# Patient Record
(demographics unavailable — no encounter records)

---

## 2019-07-14 NOTE — DIAGNOSTIC IMAGING REPORT
INDICATION: Fall, right shoulder pain



FINDINGS:  

Three views of the right shoulder show no fracture, dislocation

or other acute bony abnormality. There are mild degenerative

changes at the glenohumeral joint. There is slight narrowing of

the space between the humeral head and acromion which may be

secondary to rotator cuff disease.



IMPRESSION:

There are degenerative changes present with no acute abnormality

seen.



Dictated by: 



  Dictated on workstation # [id number]

## 2019-07-25 NOTE — DIAGNOSTIC IMAGING REPORT
EXAMINATION: Magnetic resonance imaging of the right shoulder

without contrast. 



DATE: July 25, 2019.



COMPARISON: Right shoulder radiograph July 14, 2019.



HISTORY: 69-year-old male, injury one week ago. Right shoulder

pain.



TECHNIQUE: Magnetic Resonance Imaging sequences were performed of

the shoulder without contrast.  



FINDINGS: 



ROTATOR CUFF, LIGAMENTS, TENDONS, AND MUSCLES: 

There are full-thickness fullwidth tears of the supraspinatus and

infraspinatus tendons with tendon retraction near the level of

the glenoid. The teres minor tendon is intact. There is a

full-thickness tear of the subscapularis tendon with tendon

retraction estimated at 1.5 cm.



There is no pronounced fatty atrophy of the rotator cuff

musculature. There is very low-level edema in the supraspinatus

and infraspinatus muscles.



LONG HEAD OF BICEPS:

The long head of biceps tendon is medially subluxed and partially

perched on the lesser tuberosity. The biceps labral attachment is

intact.    



GLENOHUMERAL JOINT: 

The humeral head is superiorly subluxed and directly abuts the

undersurface of the acromion. There is mild posterior subluxation

of the humeral head relative to the glenoid. The labrum is

grossly intact. There is no identified paralabral cyst. The

articular cartilage is grossly intact. There is a small

glenohumeral joint effusion. 



ACROMIOCLAVICULAR JOINT: 

The acromioclavicular joint is normally aligned. The

coracoclavicular and coracoacromial ligaments are intact. There

are mild acromioclavicular degenerative changes without large

undersurface osteophyte. 



BONE: 

There is no os acromiale. Additional osseous morphology is

unremarkable. The bone marrow signal is within normal limits.

Specifically, negative for fracture, osteomyelitis,

osteonecrosis, or marrow replacing process.



BURSAE AND SOFT TISSUES: 

There is fluid in the subacromial subdeltoid bursa compatible

with the full-thickness rotator cuff tendon tears, bursitis,

and/or recent injection.



IMPRESSION: 



1. Full-thickness fullwidth tears of the supraspinatus and

infraspinatus tendons as well as subscapularis. The supraspinatus

and infraspinatus tendons are retracted near the level of the

glenoid. There is estimated 1.5 cm retraction of the

subscapularis tendon. Very low level edema in the supraspinatus

and infraspinatus muscles without prominent fatty muscle atrophy.

The edema could relate to low-grade muscle strains and/or early

denervation related signal changes.

2. Mild acromioclavicular degenerative changes without

undersurface osteophyte.

3. Superiorly subluxed and mildly posteriorly subluxed humeral

head with small glenohumeral joint effusion. Grossly intact

labrum.

4. No acute fracture, bone contusion, or evidence of

osteonecrosis.

5. Fluid in the subacromial subdeltoid bursa compatible with the

full-thickness rotator cuff tendon tear, bursitis, and/or recent

injection.



Dictated by: 



  Dictated on workstation # [id number]

## 2019-08-19 NOTE — STRESS TEST
DATE OF SERVICE:  08/16/2019



RESTING AND POST REGADENOSON TECHNETIUM-99M TETROFOSMIN SPECT CT IMAGING



ORDERING PHYSICIAN:

VIOLA Smith



PRIMARY PHYSICIAN:

Dr. Bañuelos.



OTHER PHYSICIAN:

Johnna Bauer MD, MA, FACP, FACC



CLINICAL DIAGNOSES:

Coronary artery disease.



Baseline images were carried out after injection of 10.68 mCi of technetium-99m

Tetrofosmin.  This was followed by 0.4 mg regadenoson and 28.4 mCi of

technetium-99m Tetrofosmin for stress imaging.  The electrocardiogram showed

sinus rhythm at baseline.  It did not change significantly with regadenoson

infusion.  The patient tolerated the procedure well.  Review of images at rest

and following stress does not indicate any distinct perfusion defects consistent

with significant myocardial ischemia or infarction.  Gated images showed normal

global left ventricular systolic function, normal regional wall motion.  Left

ventricular ejection fraction is calculated to be 55%.  Left ventricular end

diastolic volume is 140 mL.  TID is absent (1.07).



CONCLUSIONS:

1.  Mild-to-moderate cardiomegaly without evidence of significant myocardial

ischemia or infarction.

2.  Normal regional wall motion.

3.  Normal global left ventricular systolic function with a calculated ejection

fraction of 55%.





Job ID: [id number]

DocumentID: [id number]

Dictated Date:  08/19/2019 11:43:23

Transcription Date: 08/19/2019 15:03:20

Dictated By: JOHNNA BAUER MD, MA, FACP, FACC,

## 2020-02-07 NOTE — ED SYNCOPE
General


Chief Complaint:  Dizziness/Syncope


Stated Complaint:  DIZZY


Source of Information:  Patient, Spouse


Exam Limitations:  No Limitations





History of Present Illness


Date Seen by Provider:  Feb 6, 2020


Time Seen by Provider:  23:48


Initial Comments


The patient arrives the ER by private conveyance with his spouse and chief 

complaint for the past week he's been having intermittent episodes lasting a few

minutes at the time of feeling like his head was full, big and like he was going

to pass out. He has not had any syncopal episodes. Tonight he woke up around 

10:00 with this symptom and it has not gone away. He has tried Flonase couple 

times. He's tried some over-the-counter cough and cold/congestion medications 

with no relief. He used to follow with Dr. Bañuelos but recently established care 

3 weeks ago with Arjun Briceno and has not followed up since then. He is not

having any chest pain, irregular heartbeat or palpitations. No history of 

dysrhythmias. He did have a CABG 5 years ago. He is not having any shortness of 

breath or exertional dyspnea. No nausea vomiting diarrhea, abdominal pain, 

dysuria. He does not routinely drink or smoke.





Allergies and Home Medications


Allergies


Coded Allergies:  


     No Known Drug Allergies (Verified , 2/6/08)





Home Medications


Ascorbic Acid 500 Mg Tablet, 500 MG PO DAILY, (Reported)


Aspirin 81 Mg Tabec, 81 MG PO DAILY, (Reported)


Atenolol 25 Mg Tablet, 25 MG PO DAILY, (Reported)


Cholecalciferol 2,000 Unit Capsule, 2,000 UNIT PO DAILY, (Reported)


Clopidogrel 75 Mg Tablet, 75 MG PO DAILY, (Reported)


Cyanocobalamin 1,000 Mcg Tablet.sa, 1,000 MCG PO DAILY, (Reported)


Enalapril Maleate 10 Mg Tablet, 10 MG PO BID, (Reported)


Nifedipine 60 Mg Tab, 60 MG PO DAILY, (Reported)


Omega 3 Polyunsat Fatty Acids 1,000 Mg Cap, 1,000 MG PO BID, (Reported)


Simvastatin 40 Mg Tablet, 40 MG PO HS, (Reported)





Patient Home Medication List


Home Medication List Reviewed:  Yes





Review of Systems


Constitutional:  see HPI; No chills; dizziness; No fever, No malaise


EENTM:  see HPI (ear pressure), nose congestion; No ear discharge, No ear pain


Respiratory:  No cough, No dyspnea on exertion, No short of breath, No wheezing


Cardiovascular:  No chest pain, No edema; Hx of Intervention; No palpitations; 

syncope (near syncope without), vascular heart diseas


Gastrointestinal:  No abdominal pain, No constipation, No diarrhea, No nausea, 

No vomiting


Genitourinary:  No discharge, No dysuria


Musculoskeletal:  No back pain, No joint pain


Psychiatric/Neurological:  Denies Anxiety, Denies Depressed





All Other Systems Reviewed


Negative Unless Noted:  Yes





Past Medical-Social-Family Hx


Patient Social History


Alcohol Use:  Denies Use


Recreational Drug Use:  No


Smoking Status:  Never a Smoker


Recent Foreign Travel:  No


Contact w/Someone Who Travel:  No





Immunizations Up To Date


Date of Influenza Vaccine:  Oct 14, 2014





Past Medical History


Reproductive Disorders:  No





Physical Exam


Vital Signs





Vital Signs - First Documented








 2/7/20





 00:17


 


Pulse 63





 62





 60


 


B/P (MAP) 154/82 (106)





 154/78 (103)





 149/92 (111)





Capillary Refill :


Height, Weight, BMI


Height: 5'11"


Weight: 210lbs. oz. [phone];  BMI


Method:Stated


General Appearance:  No Apparent Distress, WD/WN


HEENT:  PERRL/EOMI, TMs Normal, Normal ENT Inspection, Pharynx Normal; No Moist 

Mucous Membranes


Neck:  Full Range of Motion, Normal Inspection


Cardiovascular:  No Edema, Normal Peripheral Pulses


Respiratory:  Chest Non Tender, Lungs Clear, Normal Breath Sounds, No Accessory 

Muscle Use, No Respiratory Distress


Gastrointestinal:  Normal Bowel Sounds, Non Tender, Soft


Extremities:  Normal Capillary Refill, Normal Inspection, No Pedal Edema


Neurologic/Psychiatric:  Alert, Oriented x3


Cranial Nerves:  Normal Hearing, Normal Speech, PERRL


Motor/Sensory:  No Motor Deficit, No Sensory Deficit


Skin:  Normal Color, Warm/Dry





Progress/Results/Core Measures


Results/Orders


Lab Results





Laboratory Tests








Test


 2/6/20


23:48 2/7/20


00:13 Range/Units


 


 


White Blood Count


 6.1 


 


 4.3-11.0


10^3/uL


 


Red Blood Count


 5.45 


 


 4.35-5.85


10^6/uL


 


Hemoglobin 16.8   13.3-17.7  G/DL


 


Hematocrit 47   40-54  %


 


Mean Corpuscular Volume 86   80-99  FL


 


Mean Corpuscular Hemoglobin 31   25-34  PG


 


Mean Corpuscular Hemoglobin


Concent 36 


 


 32-36  G/DL





 


Red Cell Distribution Width 13.1   10.0-14.5  %


 


Platelet Count


 118 L


 


 130-400


10^3/uL


 


Mean Platelet Volume 9.5   7.4-10.4  FL


 


Neutrophils (%) (Auto) 45   42-75  %


 


Lymphocytes (%) (Auto) 39   12-44  %


 


Monocytes (%) (Auto) 12   0-12  %


 


Eosinophils (%) (Auto) 4   0-10  %


 


Basophils (%) (Auto) 0   0-10  %


 


Neutrophils # (Auto) 2.7   1.8-7.8  X 10^3


 


Lymphocytes # (Auto) 2.4   1.0-4.0  X 10^3


 


Monocytes # (Auto) 0.7   0.0-1.0  X 10^3


 


Eosinophils # (Auto)


 0.2 


 


 0.0-0.3


10^3/uL


 


Basophils # (Auto)


 0.0 


 


 0.0-0.1


10^3/uL


 


Sodium Level 139   135-145  MMOL/L


 


Potassium Level 3.9   3.6-5.0  MMOL/L


 


Chloride Level 108 H    MMOL/L


 


Carbon Dioxide Level 21   21-32  MMOL/L


 


Anion Gap 10   5-14  MMOL/L


 


Blood Urea Nitrogen 17   7-18  MG/DL


 


Creatinine


 0.89 


 


 0.60-1.30


MG/DL


 


Estimat Glomerular Filtration


Rate > 60 


 


  





 


BUN/Creatinine Ratio 19    


 


Glucose Level 123 H    MG/DL


 


Calcium Level 9.4   8.5-10.1  MG/DL


 


Corrected Calcium 9.2   8.5-10.1  MG/DL


 


Total Bilirubin 0.9   0.1-1.0  MG/DL


 


Aspartate Amino Transf


(AST/SGOT) 29 


 


 5-34  U/L





 


Alanine Aminotransferase


(ALT/SGPT) 42 


 


 0-55  U/L





 


Alkaline Phosphatase 67     U/L


 


Troponin I < 0.028   <0.028  NG/ML


 


B-Type Natriuretic Peptide 69.8   <100.0  PG/ML


 


Total Protein 7.3   6.4-8.2  GM/DL


 


Albumin 4.3   3.2-4.5  GM/DL


 


Urine Color  YELLOW   


 


Urine Clarity  CLEAR   


 


Urine pH  5.5  5-9  


 


Urine Specific Gravity  1.025 H 1.016-1.022  


 


Urine Protein  NEGATIVE  NEGATIVE  


 


Urine Glucose (UA)  NEGATIVE  NEGATIVE  


 


Urine Ketones  NEGATIVE  NEGATIVE  


 


Urine Nitrite  NEGATIVE  NEGATIVE  


 


Urine Bilirubin  NEGATIVE  NEGATIVE  


 


Urine Urobilinogen  0.2  < = 1.0  MG/DL


 


Urine Leukocyte Esterase  NEGATIVE  NEGATIVE  


 


Urine RBC (Auto)  TRACE-L  NEGATIVE  


 


Urine RBC  0-2   /HPF


 


Urine WBC  0-2   /HPF


 


Urine Crystals  PRESENT H  /LPF


 


Urine Amorphous Sediment


 


 FEW AMARILIS


URATES H  /LPF





 


Urine Bacteria  TRACE   /HPF


 


Urine Casts  NONE   /LPF


 


Urine Mucus  SMALL H  /LPF


 


Urine Culture Indicated  NO   








My Orders





Orders - EVANGELINA,XIOMARA J


Continuous Ekg Monitoring (2/6/20 23:53)


Ekg Tracing (2/6/20 23:53)


Troponin I (2/6/20 23:53)


BNP (2/6/20 23:53)


Orthostatic Vital Signs (Adult (2/6/20 23:53)


Cbc With Automated Diff (2/6/20 23:53)


Comprehensive Metabolic Panel (2/6/20 23:53)


Ua Culture If Indicated (2/6/20 23:53)


Ed Iv/Invasive Line Start (2/6/20 23:53)


Chest 1 View, Ap/Pa Only (2/7/20 00:06)





Vital Signs/I&O











 2/7/20





 00:17


 


Pulse 63





 62





 60


 


B/P (MAP) 154/82 (106)





 154/78 (103)





 149/92 (111)











Progress


Progress Note #1:  


   Time:  23:59


Progress Note


Bilateral ear fullness, head pressure and near syncopal episodes for the past 

week. We'll obtain some labs and urinalysis for a syncopal episode workup. We'll

get an EKG, chest x-ray and obtain orthostatic vital signs.





Stress test from August 2019 showing mild to moderate cardiomegaly without 

evidence of significant ischemia or infarction and an EF of 55%.





Echocardiogram 2017 by Dr. Bauer:


EF of 60%. No significant valvular stenosis.


Progress Note #2:  


   Time:  01:37


Progress Note


Orthostatic vital signs are normal. EKG is unremarkable.


La Verkin syncope score 1 point. Medium risk; 3.1% risk of 30-day serious adverse

event.


Initial ECG Impression Date:  Feb 7, 2020


Initial ECG Impression Time:  23:59


Initial ECG Rate:  59


Initial ECG Intervals:  Normal


Initial ECG Impression:  Normal


Initial ECG Comparisson:  Unchanged


Comment


Respiratory artifact in lateral leads V4 and V5. No ST elevation or depression. 

Normal sinus rhythm.





Diagnostic Imaging





   Diagonstic Imaging:  Xray


   Plain Films/CT/US/NM/MRI:  chest (1v)


Comments


No acute cardiopulmonary processes noted. Sternotomy wires noted.


   Reviewed:  Reviewed by Me





Departure


Impression





   Primary Impression:  


   Near syncope


Disposition:  01 HOME, SELF-CARE


Condition:  Stable





Departure-Patient Inst.


Decision time for Depature:  01:43


Referrals:  


[facility]





ARJUN SORTO MD (PCP/Family)


Primary Care Physician


Patient Instructions:  Near Fainting (DC)





Add. Discharge Instructions:  


Because of your near fainting spells becoming more frequent I would like to 

follow up with your cardiologist early next week if possible. Call for an 

appointment today.


If you begin to have other symptoms such as chest pain, shortness of breath etc.

please return to the nearest ER.


Continue taking her medications as prescribed.


All discharge instructions reviewed with patient and/or family. Voiced 

understanding.





Copy


Copies To 1:   HENRIK BAUER MD [facility] CCDS











XIOMARA HUTCHINSON                  Feb 7, 2020 00:00

## 2020-02-07 NOTE — DIAGNOSTIC IMAGING REPORT
EXAM: CHEST 1 VIEW, AP/PA ONLY



INDICATION: Dizziness.



COMPARISON: 07/17/2008.



FINDINGS: Sternotomy with mediastinal markers. Calcified

granuloma on the right. Normal heart size and central pulmonary

vascularity. No pleural effusion or pneumothorax.



IMPRESSION: No acute cardiopulmonary findings.



Dictated by: 



  Dictated on workstation # [id number]

## 2020-03-24 NOTE — DISCHARGE INST-POST CATH
Discharge Inst-CATH/EP


Post Cardiac Cath/EP D/C Inst


Follow Up/Plan


F/u with PCP as soon as possible for eval of mildly elevated liver enzymes and 

bilirubin





F/u with Dr Bauer in one month (or earlier, if needed)








ACTIVITY





* Go Home directly and rest.


* Limit activity of the leg (or wrist if it was used) for 7 days including 

aerobics, swimming,


   jogging, bicycling, etc.


* Restrict stair-climbing for 7 days if possible, if not, climb up with your 

non-cath leg, then


   bring together on the same step.


* Avoid lifting, pushing, pulling or excessive movement of the affected 

extremity for 7 days.


* Customary sexual activity may be resumed after 2 days-use caution not to use a

position  


   that strains or causes pain to the affected extremity.


* No driving for 24 hours.


* NO SMOKING. 


* Avoid straining for bowel movements for 7 days.


* Gentle walking on level ground is allowed.


* Returning to work will depend on the type of procedure and the results. Your 

doctor will discuss


   this with you.





CALL YOUR DOCTOR FOR ANY OF THE FOLLOWING:





*If bleeding from the puncture site occurs- Apply gentle pressure to site with 

clean cloth and call


   your doctor or EMS.


* If a knot or lump forms under the skin, increases in size, or causes pain.


* If bruising appears to be worsening or moving further down your leg instead of

disappearing.


* Temperature above 101 F.





CARE OF YOUR GROIN INCISION;





* Bruising or purple discoloration of the skin near the puncture site is common.


* You may shower only, no bathtub bathing for 5 days.  Be careful to avoid 

slipping as your


   leg may feel stiff.


* If a closure device was used on your femoral artery, please see the attached 

guide regarding


   care of the device and your leg.


* Leave dressing on FOR 24 hours.





CARE OF YOUR WRIST INCISION;





* Bruising or purple discoloration of the skin near the puncture site is common.


* You may shower.


* DO NOT submerge wrist.


* Leave dressing on FOR 24 hours.











HENRIK BAUER MD FACP FAC CCDS   Mar 24, 2020 11:20

## 2020-03-24 NOTE — NUR
NO SIGNS OF BLEEDING. VSS. PATIENT UP TO AMBULATE IN THE HALLS. TOLERATED ACTIVITY WELL. 
DISCHARGE INSTRUCTIONS WENT OVER WITH THE PATIENT.

## 2020-03-24 NOTE — CARDIAC CATHETERIZATION
DATE OF SERVICE:  03/24/2020



CARDIAC CATHETERIZATION REPORT



INDICATIONS:

The patient is a 70-year-old gentleman, who is known to have coronary artery

disease and has had coronary artery bypass surgery.  He has recently had some

episodes of dizziness and near syncope.  An event monitor study showed brief

runs of supraventricular tachycardia.  One three beat run of wide complex

tachycardia was seen on which nonsustained ventricular tachycardia could not be

excluded.  Cardiac catheterization was recommended.  Informed consent was

obtained.



DESCRIPTION OF PROCEDURE:

He was brought to the cardiac catheterization laboratory in a fasting state. 

Right groin was prepared and draped in the usual sterile fashion.  Lidocaine 1%

for local anesthesia.  Modified Seldinger technique was used to advance a

5-Malian sheath in right femoral artery, 5-Malian JL4 catheter was used for left

coronary angiography, 5-Malian JR4 catheter for right coronary angiography and

for angiography of the saphenous vein grafts.  A 5-Malian SAMANTHA catheter was used

for angiography of the left internal mammary artery graft to left anterior

descending.  Angiography of the right femoral artery was carried out through the

sheath.  Mynx was used to achieve hemostasis.  He tolerated the procedure well.



HEMODYNAMICS:

Left ventricular end-diastolic pressure following coronary angiography was 13

mmHg.  There was no significant pressure gradient on pullback across the aortic

valve.  Ascending aortic pressure was 109/62 with a mean 84 mmHg.



CORONARY ANGIOGRAPHY:

Left main coronary artery is free of significant disease.  Left anterior

descending artery had a 90% proximal stenosis.  Left circumflex artery had mild

diffuse disease.  Right coronary artery was heavily calcified and there are

proximal to mid vessel and distal right coronary artery stents.  There is

approximately 60% stenosis prior to the origin of the posterior descending

branch.  The very distal portion of the right coronary artery beyond the

posterior descending branch appears occluded.



AORTOCORONARY GRAFT ANGIOGRAPHY:

The more cephalic graft appears to a distal posterolateral system, which appears

to be from the right coronary.  This graft is patent and does not exhibit

significant disease.  The more caudal graft is occluded in its proximal portion.

 This, presumably, was to the posterior descending branch of the right.



LEFT INTERNAL MAMMARY GRAFT ANGIOGRAPHY:

Left internal mammary artery graft to distal left anterior descending artery is

widely patent and does not exhibit significant disease.



LEFT VENTRICULAR ANGIOGRAPHY:

Left ventricular angiography was carried out in the right anterior oblique

projection.  Global left ventricular systolic function is well preserved.  Left

ventricular ejection fraction is approximately 55%.



AORTIC ROOT ANGIOGRAPHY:

Aortic root angiography did not indicate any significant thoracic aortic

aneurysm or dissection.  The aortic root angiography was performed after the

pigtail catheter had been pulled back from the left ventricle into the ascending

aorta.  It was performed to make sure that no aortocoronary grafts have been

missed.  No aortocoronary grafts other than the ones described above were found.



CONCLUSIONS:

1.  Coronary artery disease consisting of 90% proximal stenosis of the left

anterior descending artery and moderate disease involving the left circumflex

and the right coronary arteries.  The very distal portion of the right coronary

artery appears to be occluded.

2.  Patent left internal mammary artery graft to the left anterior descending.

3.  Patent aortocoronary graft to a distal posterolateral system that is

probably from the right coronary.

4.  Occluded saphenous vein graft to posterior descending branch of the right

coronary.

5.  High normal left ventricular end-diastolic pressure.

6.  Well preserved global left ventricular systolic function with ejection

fraction approximately 55% and without distinct regional wall motion

abnormalities.



DISCUSSION AND RECOMMENDATIONS:

Based on results of the study, it appears appropriate to continue a conservative

approach.  Risk factor modification has been reviewed and an outpatient followup

is advised.





Job ID: [id number]

DocumentID: [id number]

Dictated Date:  03/24/2020 11:00:25

Transcription Date: 03/24/2020 11:32:42

Dictated By: HENRIK SEO MD, MA, FACP, FACC,

## 2020-03-24 NOTE — CARDIAC PROCEDURE NOTE-CS/ASA
Pre-Procedure Note


Pre-Op Procedure Note


H&P Reviewed


The H&P was reviewed, patient examined and no changes noted.


Date H&P Reviewed:  Mar 24, 2020


Time H&P Reviewed:  10:21





Conscious Sedation Pre-Proced


Time


10:21





ASA Score


3


For ASA 3 and 4: Consider anesthesia and medical clearance. Also, for patients

with a history of failed moderate sedation consider anesthesia.

















Airway 


 


Lungs 


 


Heart 


 


 ASA score


 


 ASA 1: a normal healthy patient


 


 ASA 2:  a patient with a mild systemic disease (mid diabetes, controlled 

hypertension, obesity 


 


 ASA 3:  a patient with a severe systemic disease that limits activity  (angina,

COPD, prior Myocardial infarction)


 


 ASA 4:  a patient with an incapacitating disease that is a constant threat to 

life (CHF, renal failure)


 


 ASA 5:  a moribund patient not expected to survive 24 hrs.  (ruptured aneurysm)


 


 ASA 6:  a declared brain-dead patient whose organs are being harvested.


 


 For emergent operations, add the letter E after the classification











Mallampati Classification


Grade 2





Sedation Plan


Analgesia, Amnesia, Plan communicated to team members, Discussed options with 

patient/fam, Discussed risks with patient/fam


The patient is an appropriate candidate to undergo the planned procedure, 

sedation, and anesthesia.





The patient immediately re-assessed prior to indication.











HENRIK SEO MD FACP FAC CCDS   Mar 24, 2020 10:21

## 2020-03-24 NOTE — DISCHARGE INST-CARDIOLOGY
Discharge Inst-Cardiac


Discharge Medications


Continued Medications:  


Ascorbic Acid (Vitamin C 500 Mg) 500 Mg Tablet


500 MG PO DAILY





Aspirin (Aspirin Ec 81 Mg) 81 Mg Tabec


81 MG PO DAILY





Cholecalciferol (Vitamin D) 2,000 Unit Capsule


2000 UNIT PO DAILY





Clopidogrel (Plavix) 75 Mg Tablet


75 MG PO DAILY





Cyanocobalamin (Vitamin B12) 1,000 Mcg Tablet.sa


1000 MCG PO DAILY





Lisinopril (Lisinopril) 5 Mg Tablet


5 MG PO BID, TAB





Metoprolol Succinate (Metoprolol Succinate) 100 Mg Tab.er.24h


100 MG PO DAILY, TAB





Omega 3 Polyunsat Fatty Acids (Fish Oil) 1,000 Mg Cap


1000 MG PO BID





 


Discontinued Medications:  


Atorvastatin Calcium (Atorvastatin Calcium) 40 Mg Tablet


40 MG PO HS, TAB











Patient Instructions


Patient Instructions:  


F/u with PCP as soon as possible for eval of mildly elevated liver enzymes


and bilirubin











HENRIK SEO MD FACP [facility]   Mar 24, 2020 11:19

## 2022-03-08 NOTE — STRESS TEST
DATE OF SERVICE:  03/08/2022



RESTING AND POST REGADENOSON TECHNETIUM-99M TETROFOSMIN IMAGING



ORDERING PHYSICIAN:

VIOLA Smith



PRIMARY PHYSICIAN:

Dr. Mario Alberto Severino.



OTHER PHYSICIAN:

Dr. Bauer.



CLINICAL DIAGNOSIS:

Coronary artery disease.



Baseline images were carried out after injection of 10.29 mCi of technetium-99m

Tetrofosmin.  This was followed by 0.4 mg regadenoson and 30.8 mCi of

technetium-99m Tetrofosmin for stress imaging.  The electrocardiogram showed

sinus rhythm at baseline.  It did not change significantly with regadenoson

infusion.  The patient noted mild shortness of breath following regadenoson

infusion, which resolved in a few minutes.  Review of images at rest and

following stress does not indicate any significant perfusion defects consistent

with myocardial ischemia or infarction.  Gated images show normal global left

ventricular systolic function with normal regional wall motion.  Left

ventricular ejection fraction is calculated to be 49%.  Left ventricular

end-diastolic volume is 102 mL.



CONCLUSIONS:

1.  No evidence of any significant myocardial ischemia or infarction is seen.

2.  No regional wall motion abnormality is seen of the study.

3.  Well preserved global left ventricular systolic function with a calculated

ejection fraction of 49%.

4.  Mild cardiomegaly.





Job ID: [id number]

DocumentID: [id number]

Dictated Date:  03/08/2022 18:06:53

Transcription Date: 03/08/2022 19:31:55

Dictated By: HENRIK BAUER MD, MA, FACP, FACC,

## 2022-08-02 NOTE — DISCHARGE INST-SIMPLE/STANDARD
Discharge Inst-Standard


Patient Instructions/Follow Up


Plan of Care/Instructions/FU:  


2 weeks Rylan


Restart plavix in 4 days.


Activity as Tolerated:  Yes


Discharge Diet:  Regular Diet











JANIS HERNANDEZ DO               Aug 2, 2022 09:39

## 2022-08-02 NOTE — OPERATIVE REPORT
DATE OF SERVICE:  08/02/2022



PREOPERATIVE DIAGNOSIS:

Screening colonoscopy.



POSTOPERATIVE DIAGNOSIS:

Colon polyp.



PROCEDURE:

Colonoscopy with hot biopsy polypectomy x1.



SURGEON:

Janis Fagan DO



ANESTHESIA:

Per MDA.



ESTIMATED BLOOD LOSS:

None.



COMPLICATIONS:

None.



INDICATIONS:

The patient is a 72-year-old male needing screening colonoscopy.  He understands

risks and benefits of procedure and wished to proceed.  Consent was signed in

the chart.



DESCRIPTION OF PROCEDURE:

The patient was taken to endoscopy suite, placed in the left lateral recumbent

position.  Timeout was performed.  Digital rectal exam was performed.  No

palpable polyps, masses or ulcerations.  Scope was inserted in the rectum and

advanced all the way to cecum with minimal difficulty.  Prep was adequate. 

Scope was slowly retracted back.  No polyps, masses or ulcerations within the

cecum, ascending, transverse colon.  In the descending colon, a small polyp was

present, which hot biopsy polypectomy was performed.  Scope was then

continuously retracted back.  No polyps, masses or ulcerations within the

sigmoid colon.  Once in the rectum, scope was retroflexed noting no other

pathology.  Scope was returned to its normal position, slowly withdrawn until

completely removed.  The patient tolerated the procedure well without any

complications, taken to recovery room in stable condition.



RECOMMENDATIONS:

The patient will need repeat colonoscopy in 5 years if benefits outweigh the

risks.  Any issues before that be seen at that time.  The patient will follow up

on pathology in the office.



CC:  Dr. Mario Alberto Severino - requested, unable to deliver.





Job ID: [id number]

DocumentID: [id number]

Dictated Date:  08/02/2022 09:42:13

Transcription Date: 08/02/2022 13:12:40

Dictated By: JANIS FAGAN DO

## 2022-08-02 NOTE — ANESTHESIA-GENERAL POST-OP
MAC


Patient Condition


Mental Status/LOC:  Same as Preop


Cardiovascular:  Satisfactory


Nausea/Vomiting:  Absent


Respiratory:  Satisfactory


Pain:  Controlled


Complications:  Absent





Post Op Complications


Complications


None





Follow Up Care/Instructions


Patient Instructions


None needed.





Anesthesiology Discharge Order


Discharge Order


Patient is doing well, no complaints, stable vital signs, no apparent adverse 

anesthesia problems.   


No complications reported per nursing.











AURORA ABERNATHY DO          Aug 2, 2022 10:34

## 2022-08-02 NOTE — PROGRESS NOTE-POST OPERATIVE
Post-Operative Progess Note


Surgeon (s)/Assistant (s)


Surgeon


JANIS HERNANDEZ DO


Assistant:  na





Pre-Operative Diagnosis


screening colonoscopy





Post-Operative Diagnosis





colon polyp





Procedure & Operative Findings


Date of Procedure


8/2/22


Procedure Performed/Findings


colonoscopy c hot bx polypectomy x 1


Anesthesia Type


per mda





Estimated Blood Loss


Estimated blood loss (mL):  none





Specimens/Packing


Specimens Removed


descending colon polyp











JANIS HERNANDEZ DO               Aug 2, 2022 09:37

## 2023-08-16 NOTE — OPERATIVE REPORT
DATE OF SERVICE: 08/16/2023



PREOPERATIVE DIAGNOSIS:

Left long finger cyst.



POSTOPERATIVE DIAGNOSIS:

Left long finger cyst.



PROCEDURE:

Left long finger cyst excision.



SURGEON:

Maximus Bell M.D.



ASSISTANT:

Temo Mejía, who assisted throughout the procedure and closed the incision.



ANESTHESIA:

Monitored anesthesia care plus local by Temo Cavazos CRNA.



TOURNIQUET TIME:

9 minutes at 250 mmHg.



ESTIMATED BLOOD LOSS:

Minimal.



DRAINS:

None.



COMPLICATIONS:

None.



POSTOPERATIVE PLAN:

Progressive activities as symptoms allow.  The patient was transferred to the 

recovery room awake and stable condition.



STATEMENT OF MEDICAL NECESSITY:

The patient is a 73-year-old right hand dominant gentleman with a several-month 

history of swelling at the base of the nail plate on his left long finger.  He 

reported occasional drainage.  He reports that this continued to recur, and 

because of this, he elected to proceed with surgical excision.  He understood 

that he may have a nail deformity postoperatively and that this could recur.



DESCRIPTION OF PROCEDURE:

After risks and benefits of the procedure were discussed and questions were 

answered and informed consent was signed and placed on the chart, the operative 

site was confirmed in the preoperative holding area initialed by surgeon.  The 

patient was then transferred to the operating room.  After adequate levels of 

monitored anesthesia care were obtained, a timeout was called, confirming the 

operative site, and under sterile conditions, a finger block was placed using 

plain lidocaine and plain Marcaine.  The left upper extremity was prepped and 

draped in the usual sterile fashion.  With the arm elevated, tourniquet inflated

to 250 mmHg, a longitudinal incision was made over the cyst.  This was excised 

to its base, leaving the germinal matrix of the nail intact.  The adjacent soft 

tissue was shelled out, but no necrotic tissue was noted.  No foreign bodies 

were noted.  The tourniquet was deflated.  Pressure was used for hemostasis.  

Wound was copiously irrigated and then closed with 4-0 nylon in simple 

interrupted fashion.  A soft dressing was applied.  The patient was transported 

to recovery room awake and stable condition.





Job ID: [id number]

DocumentID: [id number]

Dictated Date: 08/16/2023 10:04:06

Transcription Date: 08/16/2023 19:09:00

Dictated By: MAXIMUS BELL MD

## 2023-08-16 NOTE — PROGRESS NOTE-PRE OPERATIVE
Pre-Operative Progress Note


Date of Available H&P:  Aug 11, 2023


Date H&P Reviewed:  Aug 16, 2023


Time H&P Reviewed:  09:06


Changes from last HP


none


Pre-Operative Diagnosis:  left long finger cyst











BRIE BELL MD            Aug 16, 2023 09:21

## 2023-08-16 NOTE — ANESTHESIA-GENERAL POST-OP
MAC


Patient Condition


Mental Status/LOC:  Same as Preop


Cardiovascular:  Satisfactory


Nausea/Vomiting:  Absent


Respiratory:  Satisfactory


Pain:  Controlled


Complications:  Absent





Post Op Complications


Complications


None





Follow Up Care/Instructions


Patient Instructions


None needed.





Anesthesiology Discharge Order


Discharge Order


Patient is doing well, no complaints, stable vital signs, no apparent adverse 

anesthesia problems.   


No complications reported per nursing.











YAZ COLLINS CRNA              Aug 16, 2023 10:09

## 2023-08-16 NOTE — PROGRESS NOTE-POST OPERATIVE
Post-Operative Progess Note


Surgeon (s)/Assistant (s)


Surgeon


BRIE BELL MD


Assistant:  Temo Mejía





Pre-Operative Diagnosis


left long finger cyst





Post-Operative Diagnosis





left long finger cyst





Procedure & Operative Findings


Date of Procedure


8/16/23


Procedure Performed/Findings


left long finger cyst excision


Anesthesia Type


MAC plus local





Estimated Blood Loss


Estimated blood loss (mL):  minimal





Specimens/Packing


Specimens Removed


cyst


Packing:  


none











BRIE BELL MD            Aug 16, 2023 09:23

## 2023-08-17 NOTE — HISTORY AND PHYSICAL
SCHEDULED OUTPATIENT SURGERY DATE:

8/16/2023



PROCEDURE:

Left long finger cyst excision.



HISTORY:

The patient is a 73-year-old right hand dominant gentleman with complaints of a 

painful cyst on the dorsal aspect of his left long finger near his nail bed.  He

reports that it has fluctuated in size, but it has become painful and activity 

limiting.  Because of this, he has elected to proceed with surgical 

intervention.



REVIEW OF SYSTEMS:

No chest pain, no shortness of breath, no dysuria.



PAST MEDICAL HISTORY:

Myocardial infarction.



PAST SURGICAL HISTORY:

Coronary artery bypass and cardiac catheterization.



FAMILY HISTORY:

Coronary artery disease, hypertension.



MEDICATIONS:

Vitamin D, metoprolol, lisinopril, aspirin.



ALLERGIES:

No known drug allergies.



SOCIAL HISTORY:

The patient is a former smoker with a 10-pack-year history.  He drinks alcohol 

socially.



PHYSICAL EXAMINATION:

GENERAL:  The patient is well-developed, well-nourished, in no acute distress.

HEENT:  Normocephalic, atraumatic.  Pupils equal, round, reactive to light.  

Oropharynx is clear.

NECK:  Supple.  No lymphadenopathy.

LUNGS:  Clear to auscultation bilaterally.

HEART:  Regular rate and rhythm.

ABDOMEN:  Soft, nontender, nondistended.

EXTREMITIES:  His left long finger demonstrates a 6-mm cyst over the dorsum of 

his distal phalanx between the DIP joint and his proximal nail bed.  There is no

erythema or warmth.  No discharge.  There is a blister-like appearance and it is

tender to palpation.  Sensation is intact distally.  He has a ridge in his nail 

plate noted.



IMPRESSION:

Cyst, left long finger.



PLAN:

Cyst excision, left long finger.  The risks, benefits, options, ramifications 

and recovery have been discussed at length with the patient. Specifically, it 

has been explained to the patient that he may develop nail deformity, which 

would be permanent.  He understands and wishes to proceed.



This will be for outpatient surgery on 8/16/2023.





Job ID: [id number]

DocumentID: [id number]

Dictated Date: 08/10/2023 11:46:13

Transcription Date: 08/10/2023 12:11:00

Dictated By: BRIE BELL MD











<Dictated by BRIE BELL MD> 

<Electronically signed by BRIE BELL MD> 08/10/23 1528

[facility]